# Patient Record
Sex: MALE | Race: WHITE
[De-identification: names, ages, dates, MRNs, and addresses within clinical notes are randomized per-mention and may not be internally consistent; named-entity substitution may affect disease eponyms.]

---

## 2017-10-31 ENCOUNTER — HOSPITAL ENCOUNTER (OUTPATIENT)
Dept: HOSPITAL 61 - PCVCCLINIC | Age: 72
Discharge: HOME | End: 2017-10-31
Attending: INTERNAL MEDICINE
Payer: MEDICARE

## 2017-10-31 DIAGNOSIS — Z79.899: ICD-10-CM

## 2017-10-31 DIAGNOSIS — Z95.1: ICD-10-CM

## 2017-10-31 DIAGNOSIS — R94.31: ICD-10-CM

## 2017-10-31 DIAGNOSIS — I10: ICD-10-CM

## 2017-10-31 DIAGNOSIS — I25.10: Primary | ICD-10-CM

## 2017-10-31 DIAGNOSIS — Z79.82: ICD-10-CM

## 2017-10-31 DIAGNOSIS — I44.0: ICD-10-CM

## 2017-10-31 DIAGNOSIS — E78.5: ICD-10-CM

## 2017-10-31 DIAGNOSIS — I25.5: ICD-10-CM

## 2017-10-31 DIAGNOSIS — Z87.891: ICD-10-CM

## 2017-10-31 DIAGNOSIS — E11.9: ICD-10-CM

## 2017-10-31 DIAGNOSIS — R00.0: ICD-10-CM

## 2017-10-31 PROCEDURE — 93005 ELECTROCARDIOGRAM TRACING: CPT

## 2017-10-31 PROCEDURE — G0463 HOSPITAL OUTPT CLINIC VISIT: HCPCS

## 2018-05-14 ENCOUNTER — HOSPITAL ENCOUNTER (OUTPATIENT)
Dept: HOSPITAL 61 - PCVCCLINIC | Age: 73
Discharge: HOME | End: 2018-05-14
Attending: INTERNAL MEDICINE
Payer: MEDICARE

## 2018-05-14 DIAGNOSIS — Z88.0: ICD-10-CM

## 2018-05-14 DIAGNOSIS — I25.5: ICD-10-CM

## 2018-05-14 DIAGNOSIS — E11.9: ICD-10-CM

## 2018-05-14 DIAGNOSIS — Z87.891: ICD-10-CM

## 2018-05-14 DIAGNOSIS — E78.5: ICD-10-CM

## 2018-05-14 DIAGNOSIS — I25.810: Primary | ICD-10-CM

## 2018-05-14 DIAGNOSIS — Z79.899: ICD-10-CM

## 2018-05-14 DIAGNOSIS — Z79.82: ICD-10-CM

## 2018-05-14 DIAGNOSIS — Z95.1: ICD-10-CM

## 2018-05-14 DIAGNOSIS — I10: ICD-10-CM

## 2018-05-14 PROCEDURE — 93005 ELECTROCARDIOGRAM TRACING: CPT

## 2018-05-14 PROCEDURE — 80061 LIPID PANEL: CPT

## 2018-11-20 ENCOUNTER — HOSPITAL ENCOUNTER (OUTPATIENT)
Dept: HOSPITAL 61 - PCVCIMAG | Age: 73
Discharge: HOME | End: 2018-11-20
Attending: INTERNAL MEDICINE
Payer: MEDICARE

## 2018-11-20 DIAGNOSIS — I25.5: ICD-10-CM

## 2018-11-20 DIAGNOSIS — E78.00: ICD-10-CM

## 2018-11-20 DIAGNOSIS — I25.10: ICD-10-CM

## 2018-11-20 DIAGNOSIS — F32.9: ICD-10-CM

## 2018-11-20 DIAGNOSIS — Z87.891: ICD-10-CM

## 2018-11-20 DIAGNOSIS — I10: ICD-10-CM

## 2018-11-20 DIAGNOSIS — Z95.1: ICD-10-CM

## 2018-11-20 DIAGNOSIS — Z79.82: ICD-10-CM

## 2018-11-20 DIAGNOSIS — E78.5: ICD-10-CM

## 2018-11-20 DIAGNOSIS — I08.0: Primary | ICD-10-CM

## 2018-11-20 DIAGNOSIS — E11.9: ICD-10-CM

## 2018-11-20 PROCEDURE — G0463 HOSPITAL OUTPT CLINIC VISIT: HCPCS

## 2018-11-20 PROCEDURE — 93005 ELECTROCARDIOGRAM TRACING: CPT

## 2018-11-20 PROCEDURE — 93306 TTE W/DOPPLER COMPLETE: CPT

## 2018-11-20 NOTE — PCVCIMAG
--------------- APPROVED REPORT --------------





Study performed:  11/20/2018 09:31:19



EXAM: Comprehensive 2D, Doppler, and color-flow 

Echocardiogram

Patient Location: Echo lab

Status:  routine



BSA:         2.04

HR: 61 bpmBP:          142/78 mmHg

Rhythm: NSR w/ PACs



Other Information 

Study Quality: Adequate



Risk Factors: 

Cardiac Risk Factors:  HTN



Indications

Diabetes

CAD

ischemic cardiomyopathy, CABG



2D Dimensions

IVSd:  13.56 (7-11mm)

LVDd:  41.08 mm

PWd:  12.02 (7-11mm)Ascending Ao:  36.50 (22-36mm)

LVDs:  34.34 (25-40mm)

Left Atrium:  37.91 (27-40mm)

Aortic Root:  35.50 mm

LV Single Plane 4CH:  45.74 %

LV Single Plane 2CH:  52.59 %

Biplane EF:  48.7 %



Volumes

Left Atrial Volume (Systole)

Single Plane 4CH:  69.77 mLSingle Plane 2CH:  98.73 mL

LA ESV Index:  49.00 mL/m2



Aortic Valve

AoV Peak Michele.:  1.25 m/s

AO Peak Gr.:  6.59 mmHgLVOT Max PG:  3.42 mmHg

LVOT Max V:  0.92 m/s



Pulmonary Valve

PV Peak Michele.:  0.77 m/sPV Peak Gr.:  2.37 mmHg



Tricuspid Valve

TR Peak Michele.:  2.08 m/s

TR Peak Gr.:  17.42 mmHg



Left Ventricle

The left ventricle is normal size. Mild concentric left ventricular 

hypertrophy. Left ventricular systolic function is mildly decreased. 

Distal septal, distal inferoapical hypokinesis LVEF is 45-50%. This 

study is not technically sufficient to allow evaluation of the LV 

diastolic function due to arrhythmia.



Right Ventricle

The right ventricle is normal size. The right ventricular systolic 

function is normal.



Atria

Left atrium is moderately dilated. The right atrium size is 

normal.



Aortic Valve

The aortic valve is mildly sclerotic Mild aortic regurgitation. There 

is no aortic valvular stenosis.



Mitral Valve

The mitral valve is normal in structure. Mild to moderate mitral 

regurgitation. No evidence of mitral valve stenosis.



Tricuspid Valve

The tricuspid valve is normal in structure. Trace tricuspid 

regurgitation with PAP of 24 mmHg.



Pulmonic Valve

The pulmonary valve is normal in structure. There is no pulmonic 

valvular regurgitation.



Great Vessels

The aortic root is normal in size. IVC is normal in size and 

collapses &gt;50% with inspiration.



Pericardium

There is no pericardial effusion. There is no pleural 

effusion.



&lt;Conclusion&gt;

Left ventricular systolic function is mildly decreased. Distal septal,

 distal inferoapical hypokinesis

LVEF is 45-50%.

Left atrium is moderately dilated.

The aortic valve is mildly sclerotic. Mild aortic regurgitation, no 

stenosis.

The mitral valve is normal in structure. Mild to moderate mitral 

regurgitation.

Trace tricuspid regurgitation with pulmonary artery pressure of 24 

mmHg.

There is no pericardial effusion.

## 2019-05-06 ENCOUNTER — HOSPITAL ENCOUNTER (EMERGENCY)
Dept: HOSPITAL 25 - UCEAST | Age: 74
Discharge: HOME | End: 2019-05-06
Payer: COMMERCIAL

## 2019-05-06 VITALS — SYSTOLIC BLOOD PRESSURE: 176 MMHG | DIASTOLIC BLOOD PRESSURE: 83 MMHG
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
## 2019-05-06 DIAGNOSIS — S70.11XA: Primary | ICD-10-CM

## 2019-05-06 DIAGNOSIS — Z79.82: ICD-10-CM
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
## 2019-05-06 DIAGNOSIS — Z88.0: ICD-10-CM

## 2019-05-06 DIAGNOSIS — Z79.84: ICD-10-CM

## 2019-05-06 DIAGNOSIS — E78.5: ICD-10-CM

## 2019-05-06 DIAGNOSIS — Z87.891: ICD-10-CM

## 2019-05-06 DIAGNOSIS — E11.9: ICD-10-CM

## 2019-05-06 PROCEDURE — 99202 OFFICE O/P NEW SF 15 MIN: CPT

## 2019-05-06 PROCEDURE — G0463 HOSPITAL OUTPT CLINIC VISIT: HCPCS

## 2019-05-06 NOTE — UC
Lower Extremity/Ankle HPI





- HPI Summary


HPI Summary: 


73-year-old male comes in with a chief complaint of right thigh pain.  Couple 

of hours ago he tripped and fell and struck his mid right lateral thigh on a 

piece of furniture.  He is able to walk initially but over time there is been 

swelling in the lateral thigh and that's giving him more pain and now at rest 

the pain is 2 out of 10 with any kind of ambulation's 9 out of 10.  Patient did 

for the dressing around which also helped decrease the pain.  No complaint of 

hip or knee pain.  Denies any other injuries.  He is not on a blood thinner.  

No complaint of weakness or numbness.





- History of Current Complaint


Chief Complaint: UCLowerExtremity


Stated Complaint: R THIGH COMPLAINT


Time Seen by Provider: 05/06/19 22:05


Pain Intensity: 2





- Allergies/Home Medications


Allergies/Adverse Reactions: 


 Allergies











Allergy/AdvReac Type Severity Reaction Status Date / Time


 


Penicillins Allergy Severe Anaphylatic Verified 05/06/19 22:11





   Shock  











Home Medications: 


 Home Medications





Aspirin 81 mg CHEW TAB* [Aspirin Low Dose TAB*] 81 mg PO DAILY 05/06/19 [

History Confirmed 05/06/19]


Azilsartan Med/Chlorthalidone [Edarbyclor] 1 tab PO DAILY 05/06/19 [History 

Confirmed 05/06/19]


Carvedilol TAB* [Coreg TAB*] 6.25 mg PO BID 05/06/19 [History Confirmed 05/06/19

]


Ibuprofen TAB* [Motrin TAB* 600 MG] 1,200 mg PO ONCE PRN 05/06/19 [History 

Confirmed 05/06/19]


Rosuvastatin Calcium 20 mg PO DAILY 05/06/19 [History Confirmed 05/06/19]


Sitagliptin Phos/Metformin HCl [Janumet  mg] 1 tab PO BID 05/06/19 [

History Confirmed 05/06/19]











PMH/Surg Hx/FS Hx/Imm Hx


Previously Healthy: Yes


Endocrine History: Diabetes, Dyslipidemia


Cardiovascular History: Hypertension





- Surgical History


Surgical History: Yes


Surgery Procedure, Year, and Place: appy





- Family History


Known Family History: Positive: Non-Contributory





- Social History


Alcohol Use: None


Substance Use Type: None


Smoking Status (MU): Former Smoker





Review of Systems


All Other Systems Reviewed And Are Negative: Yes


Constitutional: Positive: Negative


Skin: Positive: Negative


Eyes: Positive: Negative


ENT: Positive: Negative


Respiratory: Positive: Negative


Cardiovascular: Positive: Negative


Gastrointestinal: Positive: Negative


Motor: Positive: Negative


Neurovascular: Positive: Negative


Musculoskeletal: Positive: Other: - see hpi


Neurological: Positive: Negative


Psychological: Positive: Negative


Is Patient Immunocompromised?: No





Physical Exam


Triage Information Reviewed: Yes


Appearance: Well-Appearing, Well-Nourished, Pain Distress - mild with rom rt 

upper leg


Vital Signs: 


 Initial Vital Signs











Temp  98.4 F   05/06/19 22:03


 


Pulse  57   05/06/19 22:03


 


Resp  16   05/06/19 22:03


 


BP  176/83   05/06/19 22:03


 


Pulse Ox  98   05/06/19 22:03











Vital Signs Reviewed: Yes


Eye Exam: Normal


Eyes: Positive: Conjunctiva Clear


Neck: Positive: Supple


Respiratory: Positive: No respiratory distress


Musculoskeletal: Positive: Other: - Patient is tender to palpation right 

lateral mid thigh.  He is swollen at that spot.  He is not tender at the 

greater trochanter of the hip and is not tender in the knee.  He is able to 

move his knee and his hip.  Normal capillary refill.  No sensation deficit.  

Normal dorsalis pedis posterior tibial pulses.


Neurological Exam: Normal


Neurological: Positive: Alert, Muscle Tone Normal


Psychological Exam: Normal


Psychological: Positive: Normal Response To Family, Age Appropriate Behavior


Skin Exam: Normal





Lower Extremity Course/Dx





- Course


Course Of Treatment: 


I discussed the x-rays with the patient and his wife.  I do not see any 

fracture radiologist reading is pending.  The injury is consistent with a 

hematoma in the lateral aspect of the thigh.  Patient has normal dorsalis pedis 

posterior tibial pulses normal capillary refill distally.  Discussed the 

possibility of compartment syndrome although the swelling is only in the 

lateral aspect it's not circumferential.  Patient's been take ibuprofen he can 

use cold compresses and follow-up with his primary care doctor or orthopedics 

if not completely improved.  He's also to get reevaluated sooner if anything 

worsens.





- Differential Dx/Diagnosis


Provider Diagnosis: 


 Hematoma of right thigh, Contusion of right thigh








Discharge





- Sign-Out/Discharge


Documenting (check all that apply): Patient Departure


All imaging exams completed and their final reports reviewed: No





- Discharge Plan


Condition: Stable


Disposition: HOME


Patient Education Materials:  Hematoma (ED), Contusion in Adults (ED), Crutch 

Instructions (ED)


Referrals: 


[facility] [place] PHYSICIAN REFERRAL [Outside]


Joanna Wheeler MD [Medical Doctor] - 


Additional Instructions: 


FOLLOW UP WITH YOUR DOCTOR OR ORTHOPEDICS IF NOT COMPLETELY IMPROVED.


GET REEVALUATED SOONER IF YOUR CONDITION WORSENS OR ANY QUESTIONS OR CONCERNS.








- Billing Disposition and Condition


Condition: STABLE


Disposition: Home

## 2019-05-07 NOTE — UC
- Progress Note


Progress Note: 





RADIOLOGY REPORT REVIEWED. No fracture of the right femur is noted. NO CHANGE 

IN MGMT.





Course/Dx





- Diagnoses


Provider Diagnoses: 


 Hematoma of right thigh, Contusion of right thigh








Discharge





- Sign-Out/Discharge


Documenting (check all that apply): Post-Discharge Follow Up


All imaging exams completed and their final reports reviewed: Yes





- Discharge Plan


Condition: Stable


Disposition: HOME


Patient Education Materials:  Crutch Instructions (ED), Contusion in Adults (ED)

, Hematoma (ED)


Referrals: 


[facility] [place] [facility]ERR[facility] [Outside]


Joanna Wheeler MD [Medical Doctor] - 


Additional Instructions: 


FOLLOW UP WITH YOUR DOCTOR OR ORTHOPEDICS IF NOT COMPLETELY IMPROVED.


GET REEVALUATED SOONER IF YOUR CONDITION WORSENS OR ANY QUESTIONS OR CONCERNS.








- Billing Disposition and Condition


Condition: STABLE


Disposition: Home

## 2019-12-10 ENCOUNTER — HOSPITAL ENCOUNTER (OUTPATIENT)
Dept: HOSPITAL 61 - PCVCCLINIC | Age: 74
Discharge: HOME | End: 2019-12-10
Attending: INTERNAL MEDICINE
Payer: MEDICARE

## 2019-12-10 DIAGNOSIS — Z79.82: ICD-10-CM

## 2019-12-10 DIAGNOSIS — I10: ICD-10-CM

## 2019-12-10 DIAGNOSIS — E78.00: ICD-10-CM

## 2019-12-10 DIAGNOSIS — Z88.8: ICD-10-CM

## 2019-12-10 DIAGNOSIS — I44.30: ICD-10-CM

## 2019-12-10 DIAGNOSIS — F32.9: ICD-10-CM

## 2019-12-10 DIAGNOSIS — E11.9: ICD-10-CM

## 2019-12-10 DIAGNOSIS — I25.10: Primary | ICD-10-CM

## 2019-12-10 DIAGNOSIS — Z87.891: ICD-10-CM

## 2019-12-10 DIAGNOSIS — Z95.1: ICD-10-CM

## 2019-12-10 DIAGNOSIS — E78.5: ICD-10-CM

## 2019-12-10 DIAGNOSIS — I25.5: ICD-10-CM

## 2019-12-10 DIAGNOSIS — Z90.49: ICD-10-CM

## 2019-12-10 PROCEDURE — G0463 HOSPITAL OUTPT CLINIC VISIT: HCPCS

## 2019-12-10 PROCEDURE — 93005 ELECTROCARDIOGRAM TRACING: CPT

## 2019-12-16 ENCOUNTER — HOSPITAL ENCOUNTER (EMERGENCY)
Dept: HOSPITAL 35 - ER | Age: 74
Discharge: HOME | End: 2019-12-16
Payer: COMMERCIAL

## 2019-12-16 VITALS — BODY MASS INDEX: 26.88 KG/M2 | HEIGHT: 71 IN | WEIGHT: 192 LBS

## 2019-12-16 VITALS — SYSTOLIC BLOOD PRESSURE: 108 MMHG | DIASTOLIC BLOOD PRESSURE: 63 MMHG

## 2019-12-16 DIAGNOSIS — I10: ICD-10-CM

## 2019-12-16 DIAGNOSIS — Z88.0: ICD-10-CM

## 2019-12-16 DIAGNOSIS — Z90.49: ICD-10-CM

## 2019-12-16 DIAGNOSIS — E11.9: ICD-10-CM

## 2019-12-16 DIAGNOSIS — M25.50: Primary | ICD-10-CM

## 2019-12-16 DIAGNOSIS — Z91.040: ICD-10-CM

## 2019-12-16 DIAGNOSIS — Z86.73: ICD-10-CM

## 2019-12-16 LAB
ALBUMIN SERPL-MCNC: 3.6 G/DL (ref 3.4–5)
ALT SERPL-CCNC: 31 U/L (ref 30–65)
ANION GAP SERPL CALC-SCNC: 12 MMOL/L (ref 7–16)
AST SERPL-CCNC: 25 U/L (ref 15–37)
BASOPHILS NFR BLD AUTO: 0.7 % (ref 0–2)
BILIRUB DIRECT SERPL-MCNC: 0.2 MG/DL
BILIRUB SERPL-MCNC: 0.6 MG/DL
BUN SERPL-MCNC: 43 MG/DL (ref 7–18)
CALCIUM SERPL-MCNC: 9.4 MG/DL (ref 8.5–10.1)
CHLORIDE SERPL-SCNC: 103 MMOL/L (ref 98–107)
CO2 SERPL-SCNC: 22 MMOL/L (ref 21–32)
CREAT SERPL-MCNC: 1.4 MG/DL (ref 0.7–1.3)
EOSINOPHIL NFR BLD: 0.2 % (ref 0–3)
ERYTHROCYTE [DISTWIDTH] IN BLOOD BY AUTOMATED COUNT: 13.8 % (ref 10.5–14.5)
GLUCOSE SERPL-MCNC: 173 MG/DL (ref 74–106)
GRANULOCYTES NFR BLD MANUAL: 91.4 % (ref 36–66)
HCT VFR BLD CALC: 34.3 % (ref 42–52)
HGB BLD-MCNC: 11.8 GM/DL (ref 14–18)
LYMPHOCYTES NFR BLD AUTO: 3.9 % (ref 24–44)
MCH RBC QN AUTO: 32.1 PG (ref 26–34)
MCHC RBC AUTO-ENTMCNC: 34.4 G/DL (ref 28–37)
MCV RBC: 93.4 FL (ref 80–100)
MONOCYTES NFR BLD: 3.8 % (ref 1–8)
NEUTROPHILS # BLD: 8.7 THOU/UL (ref 1.4–8.2)
PLATELET # BLD: 139 THOU/UL (ref 150–400)
POTASSIUM SERPL-SCNC: 4.1 MMOL/L (ref 3.5–5.1)
PROT SERPL-MCNC: 6.8 G/DL (ref 6.4–8.2)
RBC # BLD AUTO: 3.67 MIL/UL (ref 4.5–6)
SODIUM SERPL-SCNC: 137 MMOL/L (ref 136–145)
WBC # BLD AUTO: 9.5 THOU/UL (ref 4–11)

## 2020-03-11 ENCOUNTER — HOSPITAL ENCOUNTER (OUTPATIENT)
Dept: HOSPITAL 35 - SJCVCIMAG | Age: 75
End: 2020-03-11
Attending: INTERNAL MEDICINE
Payer: COMMERCIAL

## 2020-03-11 DIAGNOSIS — R94.31: ICD-10-CM

## 2020-03-11 DIAGNOSIS — Z79.899: ICD-10-CM

## 2020-03-11 DIAGNOSIS — E11.9: ICD-10-CM

## 2020-03-11 DIAGNOSIS — I10: ICD-10-CM

## 2020-03-11 DIAGNOSIS — I48.91: ICD-10-CM

## 2020-03-11 DIAGNOSIS — Z95.0: ICD-10-CM

## 2020-03-11 DIAGNOSIS — I25.10: ICD-10-CM

## 2020-03-11 DIAGNOSIS — I25.5: ICD-10-CM

## 2020-03-11 DIAGNOSIS — I34.0: Primary | ICD-10-CM

## 2020-03-11 DIAGNOSIS — E78.5: ICD-10-CM

## 2020-03-11 DIAGNOSIS — Z87.891: ICD-10-CM

## 2020-03-11 DIAGNOSIS — Z90.49: ICD-10-CM

## 2020-03-11 DIAGNOSIS — Z95.1: ICD-10-CM

## 2020-09-25 ENCOUNTER — HOSPITAL ENCOUNTER (OUTPATIENT)
Dept: HOSPITAL 35 - SJCVC | Age: 75
End: 2020-09-25
Attending: INTERNAL MEDICINE
Payer: COMMERCIAL

## 2020-09-25 DIAGNOSIS — E11.9: ICD-10-CM

## 2020-09-25 DIAGNOSIS — I25.10: ICD-10-CM

## 2020-09-25 DIAGNOSIS — R94.31: Primary | ICD-10-CM

## 2020-09-25 DIAGNOSIS — I10: ICD-10-CM

## 2020-09-25 DIAGNOSIS — I25.5: ICD-10-CM

## 2020-09-25 DIAGNOSIS — I48.91: ICD-10-CM

## 2020-09-25 DIAGNOSIS — E78.5: ICD-10-CM

## 2020-09-25 DIAGNOSIS — Z95.1: ICD-10-CM

## 2020-09-25 DIAGNOSIS — Z79.899: ICD-10-CM

## 2021-03-25 ENCOUNTER — HOSPITAL ENCOUNTER (OUTPATIENT)
Dept: HOSPITAL 35 - SJCVC | Age: 76
End: 2021-03-25
Attending: INTERNAL MEDICINE
Payer: COMMERCIAL

## 2021-03-25 DIAGNOSIS — I12.9: ICD-10-CM

## 2021-03-25 DIAGNOSIS — R00.1: ICD-10-CM

## 2021-03-25 DIAGNOSIS — F32.9: ICD-10-CM

## 2021-03-25 DIAGNOSIS — E11.22: ICD-10-CM

## 2021-03-25 DIAGNOSIS — I65.23: ICD-10-CM

## 2021-03-25 DIAGNOSIS — Z87.891: ICD-10-CM

## 2021-03-25 DIAGNOSIS — Z72.89: ICD-10-CM

## 2021-03-25 DIAGNOSIS — I25.5: ICD-10-CM

## 2021-03-25 DIAGNOSIS — I25.10: ICD-10-CM

## 2021-03-25 DIAGNOSIS — E78.00: ICD-10-CM

## 2021-03-25 DIAGNOSIS — M35.3: ICD-10-CM

## 2021-03-25 DIAGNOSIS — R94.31: Primary | ICD-10-CM

## 2021-03-25 DIAGNOSIS — E78.5: ICD-10-CM

## 2021-03-25 DIAGNOSIS — Z88.0: ICD-10-CM

## 2021-03-25 DIAGNOSIS — Z79.899: ICD-10-CM

## 2021-03-25 DIAGNOSIS — N18.31: ICD-10-CM

## 2021-03-25 DIAGNOSIS — Z95.1: ICD-10-CM

## 2021-03-25 DIAGNOSIS — I44.0: ICD-10-CM

## 2021-09-30 ENCOUNTER — HOSPITAL ENCOUNTER (OUTPATIENT)
Dept: HOSPITAL 35 - SJCVCIMAG | Age: 76
End: 2021-09-30
Attending: INTERNAL MEDICINE
Payer: COMMERCIAL

## 2021-09-30 DIAGNOSIS — I48.0: ICD-10-CM

## 2021-09-30 DIAGNOSIS — I25.10: ICD-10-CM

## 2021-09-30 DIAGNOSIS — I65.23: ICD-10-CM

## 2021-09-30 DIAGNOSIS — M35.3: ICD-10-CM

## 2021-09-30 DIAGNOSIS — I25.5: ICD-10-CM

## 2021-09-30 DIAGNOSIS — G61.0: ICD-10-CM

## 2021-09-30 DIAGNOSIS — E11.22: ICD-10-CM

## 2021-09-30 DIAGNOSIS — I12.9: ICD-10-CM

## 2021-09-30 DIAGNOSIS — Z88.8: ICD-10-CM

## 2021-09-30 DIAGNOSIS — Z95.1: ICD-10-CM

## 2021-09-30 DIAGNOSIS — N18.31: ICD-10-CM

## 2021-09-30 DIAGNOSIS — E78.5: ICD-10-CM

## 2021-09-30 DIAGNOSIS — Z79.899: ICD-10-CM

## 2021-09-30 DIAGNOSIS — R94.31: Primary | ICD-10-CM

## 2021-09-30 DIAGNOSIS — Z87.891: ICD-10-CM

## 2021-09-30 DIAGNOSIS — Z72.89: ICD-10-CM

## 2021-09-30 DIAGNOSIS — Z88.0: ICD-10-CM

## 2021-09-30 DIAGNOSIS — F32.9: ICD-10-CM

## 2021-09-30 DIAGNOSIS — I08.3: ICD-10-CM

## 2022-11-21 ENCOUNTER — APPOINTMENT (RX ONLY)
Dept: URBAN - METROPOLITAN AREA CLINIC 39 | Facility: CLINIC | Age: 77
Setting detail: DERMATOLOGY
End: 2022-11-21

## 2022-11-21 DIAGNOSIS — L57.8 OTHER SKIN CHANGES DUE TO CHRONIC EXPOSURE TO NONIONIZING RADIATION: ICD-10-CM

## 2022-11-21 DIAGNOSIS — L21.8 OTHER SEBORRHEIC DERMATITIS: ICD-10-CM

## 2022-11-21 DIAGNOSIS — L82.1 OTHER SEBORRHEIC KERATOSIS: ICD-10-CM

## 2022-11-21 DIAGNOSIS — D22 MELANOCYTIC NEVI: ICD-10-CM

## 2022-11-21 DIAGNOSIS — Z71.89 OTHER SPECIFIED COUNSELING: ICD-10-CM

## 2022-11-21 PROBLEM — D22.71 MELANOCYTIC NEVI OF RIGHT LOWER LIMB, INCLUDING HIP: Status: ACTIVE | Noted: 2022-11-21

## 2022-11-21 PROBLEM — D22.72 MELANOCYTIC NEVI OF LEFT LOWER LIMB, INCLUDING HIP: Status: ACTIVE | Noted: 2022-11-21

## 2022-11-21 PROBLEM — D22.61 MELANOCYTIC NEVI OF RIGHT UPPER LIMB, INCLUDING SHOULDER: Status: ACTIVE | Noted: 2022-11-21

## 2022-11-21 PROBLEM — D22.39 MELANOCYTIC NEVI OF OTHER PARTS OF FACE: Status: ACTIVE | Noted: 2022-11-21

## 2022-11-21 PROBLEM — D48.5 NEOPLASM OF UNCERTAIN BEHAVIOR OF SKIN: Status: ACTIVE | Noted: 2022-11-21

## 2022-11-21 PROBLEM — D22.62 MELANOCYTIC NEVI OF LEFT UPPER LIMB, INCLUDING SHOULDER: Status: ACTIVE | Noted: 2022-11-21

## 2022-11-21 PROBLEM — D22.5 MELANOCYTIC NEVI OF TRUNK: Status: ACTIVE | Noted: 2022-11-21

## 2022-11-21 PROCEDURE — ? COUNSELING

## 2022-11-21 PROCEDURE — 11102 TANGNTL BX SKIN SINGLE LES: CPT

## 2022-11-21 PROCEDURE — ? BIOPSY BY SHAVE METHOD

## 2022-11-21 PROCEDURE — 99203 OFFICE O/P NEW LOW 30 MIN: CPT | Mod: 25

## 2022-11-21 ASSESSMENT — LOCATION DETAILED DESCRIPTION DERM
LOCATION DETAILED: MID-FRONTAL SCALP
LOCATION DETAILED: LEFT CENTRAL EYEBROW
LOCATION DETAILED: LEFT INFERIOR CENTRAL MALAR CHEEK
LOCATION DETAILED: LEFT UPPER CUTANEOUS LIP
LOCATION DETAILED: LEFT CENTRAL MALAR CHEEK
LOCATION DETAILED: LEFT PROXIMAL DORSAL FOREARM
LOCATION DETAILED: RIGHT VENTRAL DISTAL FOREARM
LOCATION DETAILED: LEFT DISTAL DORSAL FOREARM
LOCATION DETAILED: LEFT LOWER CUTANEOUS LIP
LOCATION DETAILED: RIGHT CENTRAL EYEBROW
LOCATION DETAILED: RIGHT ANTERIOR DISTAL THIGH
LOCATION DETAILED: RIGHT VENTRAL PROXIMAL FOREARM
LOCATION DETAILED: LEFT LATERAL ABDOMEN
LOCATION DETAILED: LEFT DISTAL POSTERIOR THIGH
LOCATION DETAILED: LEFT POPLITEAL SKIN
LOCATION DETAILED: EPIGASTRIC SKIN

## 2022-11-21 ASSESSMENT — LOCATION ZONE DERM
LOCATION ZONE: FACE
LOCATION ZONE: ARM
LOCATION ZONE: LEG
LOCATION ZONE: LIP
LOCATION ZONE: SCALP
LOCATION ZONE: TRUNK

## 2022-11-21 ASSESSMENT — LOCATION SIMPLE DESCRIPTION DERM
LOCATION SIMPLE: ABDOMEN
LOCATION SIMPLE: RIGHT EYEBROW
LOCATION SIMPLE: RIGHT FOREARM
LOCATION SIMPLE: LEFT POPLITEAL SKIN
LOCATION SIMPLE: RIGHT THIGH
LOCATION SIMPLE: ANTERIOR SCALP
LOCATION SIMPLE: LEFT POSTERIOR THIGH
LOCATION SIMPLE: LEFT LIP
LOCATION SIMPLE: LEFT FOREARM
LOCATION SIMPLE: LEFT CHEEK
LOCATION SIMPLE: LEFT EYEBROW

## 2022-11-21 NOTE — PROCEDURE: MIPS QUALITY
Quality 111:Pneumonia Vaccination Status For Older Adults: Pneumococcal vaccine (PPSV23) administered on or after patient’s 60th birthday and before the end of the measurement period
Quality 226: Preventive Care And Screening: Tobacco Use: Screening And Cessation Intervention: Patient screened for tobacco use and is an ex/non-smoker
Quality 130: Documentation Of Current Medications In The Medical Record: Current Medications Documented
Quality 110: Preventive Care And Screening: Influenza Immunization: Influenza Immunization Administered during Influenza season
Detail Level: Generalized
Quality 47: Advance Care Plan: Advance care planning not documented, reason not otherwise specified.

## 2022-11-21 NOTE — PROCEDURE: COUNSELING
Detail Level: Simple
Sunscreen Recommendations: Specific details regarding self-exam techniques.
Detail Level: Zone
Detail Level: Generalized

## 2022-12-20 ENCOUNTER — APPOINTMENT (RX ONLY)
Dept: URBAN - METROPOLITAN AREA CLINIC 39 | Facility: CLINIC | Age: 77
Setting detail: DERMATOLOGY
End: 2022-12-20

## 2022-12-20 DIAGNOSIS — L57.0 ACTINIC KERATOSIS: ICD-10-CM

## 2022-12-20 PROCEDURE — 17000 DESTRUCT PREMALG LESION: CPT

## 2022-12-20 PROCEDURE — ? COUNSELING

## 2022-12-20 PROCEDURE — ? LIQUID NITROGEN

## 2022-12-20 ASSESSMENT — LOCATION DETAILED DESCRIPTION DERM: LOCATION DETAILED: RIGHT INFERIOR MEDIAL FOREHEAD

## 2022-12-20 ASSESSMENT — PAIN INTENSITY VAS: HOW INTENSE IS YOUR PAIN 0 BEING NO PAIN, 10 BEING THE MOST SEVERE PAIN POSSIBLE?: NO PAIN

## 2022-12-20 ASSESSMENT — LOCATION SIMPLE DESCRIPTION DERM: LOCATION SIMPLE: RIGHT FOREHEAD

## 2022-12-20 ASSESSMENT — LOCATION ZONE DERM: LOCATION ZONE: FACE

## 2022-12-20 NOTE — PROCEDURE: LIQUID NITROGEN
Show Applicator Variable?: Yes
Duration Of Freeze Thaw-Cycle (Seconds): 15
Post-Care Instructions: I reviewed with the patient in detail post-care instructions. Patient is to wear sunprotection, and avoid picking at any of the treated lesions. Pt may apply Vaseline or aquaphor to crusted or scabbing areas.
Render Note In Bullet Format When Appropriate: No
Consent: The patient's consent was obtained including but not limited to risks of crusting, scabbing, blistering, scarring, lighter pigmentary change, recurrence, incomplete removal and infection.
Detail Level: Detailed
Number Of Freeze-Thaw Cycles: 1 freeze-thaw cycle

## 2023-11-30 ENCOUNTER — APPOINTMENT (RX ONLY)
Dept: URBAN - METROPOLITAN AREA CLINIC 39 | Facility: CLINIC | Age: 78
Setting detail: DERMATOLOGY
End: 2023-11-30

## 2023-11-30 DIAGNOSIS — L82.1 OTHER SEBORRHEIC KERATOSIS: ICD-10-CM

## 2023-11-30 DIAGNOSIS — D22 MELANOCYTIC NEVI: ICD-10-CM

## 2023-11-30 DIAGNOSIS — Z71.89 OTHER SPECIFIED COUNSELING: ICD-10-CM

## 2023-11-30 DIAGNOSIS — L21.8 OTHER SEBORRHEIC DERMATITIS: ICD-10-CM | Status: INADEQUATELY CONTROLLED

## 2023-11-30 PROBLEM — D22.71 MELANOCYTIC NEVI OF RIGHT LOWER LIMB, INCLUDING HIP: Status: ACTIVE | Noted: 2023-11-30

## 2023-11-30 PROBLEM — D22.61 MELANOCYTIC NEVI OF RIGHT UPPER LIMB, INCLUDING SHOULDER: Status: ACTIVE | Noted: 2023-11-30

## 2023-11-30 PROBLEM — D22.5 MELANOCYTIC NEVI OF TRUNK: Status: ACTIVE | Noted: 2023-11-30

## 2023-11-30 PROBLEM — D22.62 MELANOCYTIC NEVI OF LEFT UPPER LIMB, INCLUDING SHOULDER: Status: ACTIVE | Noted: 2023-11-30

## 2023-11-30 PROBLEM — D22.39 MELANOCYTIC NEVI OF OTHER PARTS OF FACE: Status: ACTIVE | Noted: 2023-11-30

## 2023-11-30 PROBLEM — D22.72 MELANOCYTIC NEVI OF LEFT LOWER LIMB, INCLUDING HIP: Status: ACTIVE | Noted: 2023-11-30

## 2023-11-30 PROCEDURE — 99213 OFFICE O/P EST LOW 20 MIN: CPT

## 2023-11-30 PROCEDURE — ? TREATMENT REGIMEN

## 2023-11-30 PROCEDURE — ? COUNSELING

## 2023-11-30 ASSESSMENT — PAIN INTENSITY VAS: HOW INTENSE IS YOUR PAIN 0 BEING NO PAIN, 10 BEING THE MOST SEVERE PAIN POSSIBLE?: NO PAIN

## 2023-11-30 ASSESSMENT — LOCATION ZONE DERM
LOCATION ZONE: ARM
LOCATION ZONE: SCALP
LOCATION ZONE: TRUNK
LOCATION ZONE: LEG
LOCATION ZONE: FACE
LOCATION ZONE: EAR

## 2023-11-30 ASSESSMENT — LOCATION DETAILED DESCRIPTION DERM
LOCATION DETAILED: MID-FRONTAL SCALP
LOCATION DETAILED: LEFT DISTAL DORSAL FOREARM
LOCATION DETAILED: LEFT INFERIOR CENTRAL MALAR CHEEK
LOCATION DETAILED: LEFT LATERAL ABDOMEN
LOCATION DETAILED: LEFT DISTAL POSTERIOR THIGH
LOCATION DETAILED: LEFT CENTRAL EYEBROW
LOCATION DETAILED: RIGHT ANTERIOR DISTAL THIGH
LOCATION DETAILED: RIGHT VENTRAL DISTAL FOREARM
LOCATION DETAILED: EPIGASTRIC SKIN
LOCATION DETAILED: RIGHT CHIN
LOCATION DETAILED: LEFT POPLITEAL SKIN
LOCATION DETAILED: GLABELLA
LOCATION DETAILED: LEFT CHIN
LOCATION DETAILED: LEFT POSTERIOR EAR
LOCATION DETAILED: RIGHT VENTRAL PROXIMAL FOREARM
LOCATION DETAILED: RIGHT CENTRAL EYEBROW
LOCATION DETAILED: LEFT CENTRAL MALAR CHEEK
LOCATION DETAILED: RIGHT SUPERIOR POSTERIOR HELIX
LOCATION DETAILED: POSTERIOR MID-PARIETAL SCALP
LOCATION DETAILED: LEFT PROXIMAL DORSAL FOREARM

## 2023-11-30 ASSESSMENT — LOCATION SIMPLE DESCRIPTION DERM
LOCATION SIMPLE: RIGHT THIGH
LOCATION SIMPLE: LEFT CHEEK
LOCATION SIMPLE: RIGHT EAR
LOCATION SIMPLE: ANTERIOR SCALP
LOCATION SIMPLE: LEFT EAR
LOCATION SIMPLE: LEFT EYEBROW
LOCATION SIMPLE: LEFT POPLITEAL SKIN
LOCATION SIMPLE: RIGHT FOREARM
LOCATION SIMPLE: LEFT POSTERIOR THIGH
LOCATION SIMPLE: GLABELLA
LOCATION SIMPLE: POSTERIOR SCALP
LOCATION SIMPLE: ABDOMEN
LOCATION SIMPLE: CHIN
LOCATION SIMPLE: RIGHT EYEBROW
LOCATION SIMPLE: LEFT FOREARM

## 2023-11-30 ASSESSMENT — SEVERITY ASSESSMENT: HOW SEVERE IS THIS PATIENT'S CONDITION?: MILD

## 2023-11-30 NOTE — PROCEDURE: COUNSELING
Sunscreen Recommendations: Specific details regarding self-exam techniques.
Detail Level: Zone
Detail Level: Generalized
Shampoo Recommendations: Nizoral AD

## 2024-06-05 ENCOUNTER — APPOINTMENT (RX ONLY)
Dept: URBAN - METROPOLITAN AREA CLINIC 39 | Facility: CLINIC | Age: 79
Setting detail: DERMATOLOGY
End: 2024-06-05

## 2024-06-05 DIAGNOSIS — L82.1 OTHER SEBORRHEIC KERATOSIS: ICD-10-CM

## 2024-06-05 PROCEDURE — ? COUNSELING

## 2024-06-05 PROCEDURE — 99212 OFFICE O/P EST SF 10 MIN: CPT

## 2024-06-05 ASSESSMENT — LOCATION ZONE DERM
LOCATION ZONE: FACE
LOCATION ZONE: LEG

## 2024-06-05 ASSESSMENT — LOCATION DETAILED DESCRIPTION DERM
LOCATION DETAILED: LEFT INFERIOR CENTRAL MALAR CHEEK
LOCATION DETAILED: RIGHT ANTERIOR PROXIMAL THIGH

## 2024-06-05 ASSESSMENT — PAIN INTENSITY VAS: HOW INTENSE IS YOUR PAIN 0 BEING NO PAIN, 10 BEING THE MOST SEVERE PAIN POSSIBLE?: NO PAIN

## 2024-06-05 ASSESSMENT — LOCATION SIMPLE DESCRIPTION DERM
LOCATION SIMPLE: RIGHT THIGH
LOCATION SIMPLE: LEFT CHEEK

## 2024-06-05 NOTE — PROCEDURE: MIPS QUALITY
Quality 130: Documentation Of Current Medications In The Medical Record: Current Medications Documented
Quality 431: Preventive Care And Screening: Unhealthy Alcohol Use - Screening: Patient not identified as an unhealthy alcohol user when screened for unhealthy alcohol use using a systematic screening method
Quality 47: Advance Care Plan: Advance Care Planning discussed and documented in the medical record; patient did not wish or was not able to name a surrogate decision maker or provide an advance care plan.
Quality 226: Preventive Care And Screening: Tobacco Use: Screening And Cessation Intervention: Patient screened for tobacco use and is an ex/non-smoker
Detail Level: Generalized

## 2024-12-11 ENCOUNTER — APPOINTMENT (OUTPATIENT)
Dept: URBAN - METROPOLITAN AREA CLINIC 39 | Facility: CLINIC | Age: 79
Setting detail: DERMATOLOGY
End: 2024-12-11

## 2024-12-11 DIAGNOSIS — L57.8 OTHER SKIN CHANGES DUE TO CHRONIC EXPOSURE TO NONIONIZING RADIATION: ICD-10-CM

## 2024-12-11 DIAGNOSIS — Z71.89 OTHER SPECIFIED COUNSELING: ICD-10-CM

## 2024-12-11 DIAGNOSIS — L82.1 OTHER SEBORRHEIC KERATOSIS: ICD-10-CM

## 2024-12-11 DIAGNOSIS — D22 MELANOCYTIC NEVI: ICD-10-CM

## 2024-12-11 PROBLEM — D22.39 MELANOCYTIC NEVI OF OTHER PARTS OF FACE: Status: ACTIVE | Noted: 2024-12-11

## 2024-12-11 PROBLEM — D22.61 MELANOCYTIC NEVI OF RIGHT UPPER LIMB, INCLUDING SHOULDER: Status: ACTIVE | Noted: 2024-12-11

## 2024-12-11 PROBLEM — D22.71 MELANOCYTIC NEVI OF RIGHT LOWER LIMB, INCLUDING HIP: Status: ACTIVE | Noted: 2024-12-11

## 2024-12-11 PROBLEM — D22.5 MELANOCYTIC NEVI OF TRUNK: Status: ACTIVE | Noted: 2024-12-11

## 2024-12-11 PROBLEM — D22.62 MELANOCYTIC NEVI OF LEFT UPPER LIMB, INCLUDING SHOULDER: Status: ACTIVE | Noted: 2024-12-11

## 2024-12-11 PROBLEM — D22.72 MELANOCYTIC NEVI OF LEFT LOWER LIMB, INCLUDING HIP: Status: ACTIVE | Noted: 2024-12-11

## 2024-12-11 PROCEDURE — 99213 OFFICE O/P EST LOW 20 MIN: CPT

## 2024-12-11 PROCEDURE — ? COUNSELING

## 2024-12-11 ASSESSMENT — LOCATION ZONE DERM
LOCATION ZONE: LEG
LOCATION ZONE: FACE
LOCATION ZONE: ARM
LOCATION ZONE: TRUNK

## 2024-12-11 ASSESSMENT — LOCATION SIMPLE DESCRIPTION DERM
LOCATION SIMPLE: LEFT POPLITEAL SKIN
LOCATION SIMPLE: LEFT POSTERIOR THIGH
LOCATION SIMPLE: ABDOMEN
LOCATION SIMPLE: LEFT CHEEK
LOCATION SIMPLE: RIGHT THIGH
LOCATION SIMPLE: LEFT FOREARM
LOCATION SIMPLE: RIGHT FOREARM

## 2024-12-11 ASSESSMENT — LOCATION DETAILED DESCRIPTION DERM
LOCATION DETAILED: RIGHT VENTRAL PROXIMAL FOREARM
LOCATION DETAILED: LEFT POPLITEAL SKIN
LOCATION DETAILED: LEFT LATERAL ABDOMEN
LOCATION DETAILED: LEFT PROXIMAL DORSAL FOREARM
LOCATION DETAILED: LEFT DISTAL POSTERIOR THIGH
LOCATION DETAILED: RIGHT VENTRAL DISTAL FOREARM
LOCATION DETAILED: EPIGASTRIC SKIN
LOCATION DETAILED: LEFT INFERIOR CENTRAL MALAR CHEEK
LOCATION DETAILED: LEFT DISTAL DORSAL FOREARM
LOCATION DETAILED: RIGHT ANTERIOR DISTAL THIGH
LOCATION DETAILED: LEFT CENTRAL MALAR CHEEK

## 2024-12-11 ASSESSMENT — PAIN INTENSITY VAS: HOW INTENSE IS YOUR PAIN 0 BEING NO PAIN, 10 BEING THE MOST SEVERE PAIN POSSIBLE?: NO PAIN

## 2024-12-11 NOTE — PROCEDURE: COUNSELING
Detail Level: Generalized
Sunscreen Recommendation Label Override: SPF 30+ recommended
Detail Level: Zone
Sunscreen Recommendations: Specific details regarding self-exam techniques.